# Patient Record
Sex: FEMALE | Race: WHITE
[De-identification: names, ages, dates, MRNs, and addresses within clinical notes are randomized per-mention and may not be internally consistent; named-entity substitution may affect disease eponyms.]

---

## 2017-08-08 ENCOUNTER — HOSPITAL ENCOUNTER (OUTPATIENT)
Dept: HOSPITAL 53 - M SMT | Age: 57
End: 2017-08-08
Attending: UROLOGY
Payer: COMMERCIAL

## 2017-08-08 DIAGNOSIS — R39.15: Primary | ICD-10-CM

## 2017-08-14 ENCOUNTER — HOSPITAL ENCOUNTER (OUTPATIENT)
Dept: HOSPITAL 53 - M SMT | Age: 57
End: 2017-08-14
Attending: UROLOGY
Payer: COMMERCIAL

## 2017-08-14 DIAGNOSIS — R10.9: Primary | ICD-10-CM

## 2017-08-14 NOTE — REP
KUB ABDOMEN AND PELVIS:

 

KUB film of the abdomen and pelvis is performed.

 

The bowel gas pattern is normal.  Multiple phleboliths are seen in the pelvis.

There is also a phlebolith in the left lower quadrant.  No definite abnormal

calcifications are seen overlying the renal shadows.

 

IMPRESSION:

 

Multiple phleboliths.

 

 

Signed by

Lane Younger MD 08/15/2017 12:31 P

## 2017-08-28 ENCOUNTER — HOSPITAL ENCOUNTER (OUTPATIENT)
Dept: HOSPITAL 53 - M RAD | Age: 57
End: 2017-08-28
Attending: UROLOGY
Payer: COMMERCIAL

## 2017-08-28 DIAGNOSIS — N20.0: Primary | ICD-10-CM

## 2017-08-28 NOTE — REP
Clinical:  Nephrolithiasis.

 

Comparison:  07/20/2016.

 

Findings:

Lung bases are clear.  Visualized heart and pericardium normal.

 

Liver, spleen, pancreas, gallbladder, bilateral adrenal glands and kidneys are

normal for noncontrast evaluation.  Specifically, no perinephric stranding,

hydroureternephrosis, intrarenal or obstructing ureteral calculi are identified.

The enteric system is without obstruction or acute inflammatory process.  Normal

terminal ileum identified in the right lower quadrant.  Scattered sigmoid

diverticula noted without acute diverticulitis.  Pelvis demonstrates normal

bladder and evidence for prior hysterectomy.  No ascites.  No pelvic fluid.  No

free air.  No adenopathy.  Minimal atherosclerotic changes of the aorta and

vasculature noted without aneurysm.  Surrounding musculoskeletal structures

demonstrate age-related changes without focal osseous abnormality.

 

Impression:

1.  No acute abdominopelvic pathology appreciated.

2.  No evidence for nephrolithiasis or obvious acute urinary tract abnormality.

 

 

Signed by

Stan Torrez MD 08/28/2017 09:39 P

## 2019-07-01 ENCOUNTER — HOSPITAL ENCOUNTER (OUTPATIENT)
Dept: HOSPITAL 53 - M LABNEURO | Age: 59
End: 2019-07-01
Attending: PSYCHIATRY & NEUROLOGY
Payer: COMMERCIAL

## 2019-07-01 DIAGNOSIS — R51: Primary | ICD-10-CM

## 2019-07-01 LAB
25(OH)D3 SERPL-MCNC: 15.2 NG/ML (ref 30–100)
ALBUMIN SERPL BCG-MCNC: 3.6 GM/DL (ref 3.2–5.2)
ALT SERPL W P-5'-P-CCNC: 30 U/L (ref 12–78)
BASOPHILS # BLD AUTO: 0.1 10^3/UL (ref 0–0.2)
BASOPHILS NFR BLD AUTO: 1.2 % (ref 0–1)
BILIRUB SERPL-MCNC: 0.3 MG/DL (ref 0.2–1)
BUN SERPL-MCNC: 20 MG/DL (ref 7–18)
CALCIUM SERPL-MCNC: 9.2 MG/DL (ref 8.5–10.1)
CHLORIDE SERPL-SCNC: 107 MEQ/L (ref 98–107)
CO2 SERPL-SCNC: 29 MEQ/L (ref 21–32)
CREAT SERPL-MCNC: 0.85 MG/DL (ref 0.55–1.3)
EOSINOPHIL # BLD AUTO: 0.2 10^3/UL (ref 0–0.5)
EOSINOPHIL NFR BLD AUTO: 3.6 % (ref 0–3)
ERYTHROCYTE [SEDIMENTATION RATE] IN BLOOD BY WESTERGREN METHOD: 6 MM/HR (ref 0–30)
GFR SERPL CREATININE-BSD FRML MDRD: > 60 ML/MIN/{1.73_M2} (ref 51–?)
GLUCOSE SERPL-MCNC: 124 MG/DL (ref 70–100)
HCT VFR BLD AUTO: 36.5 % (ref 36–47)
HGB BLD-MCNC: 12.1 G/DL (ref 12–15.5)
LYMPHOCYTES # BLD AUTO: 1.8 10^3/UL (ref 1.5–4.5)
LYMPHOCYTES NFR BLD AUTO: 31.6 % (ref 24–44)
MCH RBC QN AUTO: 31.3 PG (ref 27–33)
MCHC RBC AUTO-ENTMCNC: 33.2 G/DL (ref 32–36.5)
MCV RBC AUTO: 94.3 FL (ref 80–96)
MONOCYTES # BLD AUTO: 0.4 10^3/UL (ref 0–0.8)
MONOCYTES NFR BLD AUTO: 7.4 % (ref 0–5)
NEUTROPHILS # BLD AUTO: 3.2 10^3/UL (ref 1.8–7.7)
NEUTROPHILS NFR BLD AUTO: 55.9 % (ref 36–66)
PLATELET # BLD AUTO: 236 10^3/UL (ref 150–450)
POTASSIUM SERPL-SCNC: 4.6 MEQ/L (ref 3.5–5.1)
PROT SERPL-MCNC: 6.6 GM/DL (ref 6.4–8.2)
RBC # BLD AUTO: 3.87 10^6/UL (ref 4–5.4)
RHEUMATOID FACT SERPL-ACNC: < 10 IU/ML (ref ?–15)
SODIUM SERPL-SCNC: 141 MEQ/L (ref 136–145)
TSH SERPL DL<=0.005 MIU/L-ACNC: 1.04 UIU/ML (ref 0.36–3.74)
WBC # BLD AUTO: 5.8 10^3/UL (ref 4–10)